# Patient Record
Sex: MALE | Race: WHITE | URBAN - METROPOLITAN AREA
[De-identification: names, ages, dates, MRNs, and addresses within clinical notes are randomized per-mention and may not be internally consistent; named-entity substitution may affect disease eponyms.]

---

## 2024-01-03 ENCOUNTER — OFFICE VISIT (OUTPATIENT)
Age: 89
End: 2024-01-03
Payer: MEDICARE

## 2024-01-03 VITALS
RESPIRATION RATE: 17 BRPM | HEART RATE: 77 BPM | WEIGHT: 196 LBS | HEIGHT: 71 IN | SYSTOLIC BLOOD PRESSURE: 142 MMHG | OXYGEN SATURATION: 98 % | DIASTOLIC BLOOD PRESSURE: 90 MMHG | BODY MASS INDEX: 27.44 KG/M2

## 2024-01-03 DIAGNOSIS — C44.42 SQUAMOUS CELL CARCINOMA OF SKIN OF SCALP: Primary | ICD-10-CM

## 2024-01-03 PROCEDURE — G8484 FLU IMMUNIZE NO ADMIN: HCPCS | Performed by: OTOLARYNGOLOGY

## 2024-01-03 PROCEDURE — 99203 OFFICE O/P NEW LOW 30 MIN: CPT | Performed by: OTOLARYNGOLOGY

## 2024-01-03 PROCEDURE — 1123F ACP DISCUSS/DSCN MKR DOCD: CPT | Performed by: OTOLARYNGOLOGY

## 2024-01-03 PROCEDURE — G8419 CALC BMI OUT NRM PARAM NOF/U: HCPCS | Performed by: OTOLARYNGOLOGY

## 2024-01-03 PROCEDURE — 1036F TOBACCO NON-USER: CPT | Performed by: OTOLARYNGOLOGY

## 2024-01-03 PROCEDURE — G8427 DOCREV CUR MEDS BY ELIG CLIN: HCPCS | Performed by: OTOLARYNGOLOGY

## 2024-01-03 RX ORDER — AMIODARONE HYDROCHLORIDE 100 MG/1
100 TABLET ORAL DAILY
COMMUNITY

## 2024-01-03 RX ORDER — PANTOPRAZOLE SODIUM 40 MG/1
40 TABLET, DELAYED RELEASE ORAL DAILY
COMMUNITY
Start: 2023-08-11

## 2024-01-03 RX ORDER — FERROUS SULFATE 325(65) MG
1 TABLET ORAL DAILY
COMMUNITY
Start: 2023-09-06

## 2024-01-03 RX ORDER — TAMSULOSIN HYDROCHLORIDE 0.4 MG/1
CAPSULE ORAL
COMMUNITY
Start: 2018-04-04

## 2024-01-03 ASSESSMENT — ENCOUNTER SYMPTOMS
ABDOMINAL PAIN: 0
VOICE CHANGE: 0
STRIDOR: 0
CHOKING: 0
EYE ITCHING: 0
SHORTNESS OF BREATH: 0
SINUS PRESSURE: 0
VOMITING: 0
NAUSEA: 0
EYE DISCHARGE: 0
SINUS PAIN: 0
RHINORRHEA: 0
COUGH: 0
APNEA: 0
TROUBLE SWALLOWING: 0
SORE THROAT: 0
BACK PAIN: 1

## 2024-01-03 NOTE — PROGRESS NOTES
Subjective:    Aaron Santamaria   88 y.o.   1935     New Patient Visit    Chief Complaint   Patient presents with    New Patient     Skin cancer consult     History of Present Illness:    1/3/2024- office    88-year-old male presents with a vertex scalp lesion.  He does have history of several skin cancers in the past, notably on the ear.  Scalp lesion was noted for several weeks had a biopsy done in December 2023, per report positive for squamous cell carcinoma.  Sent here for further excision.  He does not take any aspirin or blood thinners.    Review of Systems  Review of Systems   Constitutional:  Negative for activity change, appetite change, chills, fatigue and fever.   HENT:  Positive for hearing loss. Negative for congestion, ear discharge, ear pain, mouth sores, nosebleeds, postnasal drip, rhinorrhea, sinus pressure, sinus pain, sneezing, sore throat, tinnitus, trouble swallowing and voice change.    Eyes:  Negative for discharge, itching and visual disturbance.   Respiratory:  Negative for apnea, cough, choking, shortness of breath and stridor.    Cardiovascular:  Negative for chest pain and palpitations.   Gastrointestinal:  Negative for abdominal pain, nausea and vomiting.   Endocrine: Negative for cold intolerance and heat intolerance.   Genitourinary:  Negative for difficulty urinating and dysuria.   Musculoskeletal:  Positive for arthralgias, back pain, gait problem and myalgias. Negative for neck pain and neck stiffness.   Skin:  Negative for rash and wound.   Allergic/Immunologic: Negative for environmental allergies and food allergies.   Neurological:  Negative for dizziness, speech difficulty, light-headedness and headaches.   Hematological:  Negative for adenopathy. Does not bruise/bleed easily.   Psychiatric/Behavioral:  Negative for confusion and sleep disturbance. The patient is not nervous/anxious.            Past Medical History:   Diagnosis Date    Stomach ulcer      Past Surgical

## 2024-01-15 RX ORDER — FUROSEMIDE 20 MG/1
20 TABLET ORAL DAILY
COMMUNITY

## 2024-01-17 ENCOUNTER — APPOINTMENT (OUTPATIENT)
Facility: HOSPITAL | Age: 89
End: 2024-01-17
Attending: SURGERY
Payer: MEDICARE

## 2024-01-17 ENCOUNTER — HOSPITAL ENCOUNTER (OUTPATIENT)
Facility: HOSPITAL | Age: 89
Discharge: HOME | End: 2024-01-17
Attending: SURGERY | Admitting: SURGERY
Payer: MEDICARE

## 2024-01-17 ENCOUNTER — ANESTHESIA EVENT (OUTPATIENT)
Facility: HOSPITAL | Age: 89
End: 2024-01-17
Payer: MEDICARE

## 2024-01-17 ENCOUNTER — ANESTHESIA (OUTPATIENT)
Facility: HOSPITAL | Age: 89
End: 2024-01-17
Payer: MEDICARE

## 2024-01-17 VITALS
RESPIRATION RATE: 18 BRPM | SYSTOLIC BLOOD PRESSURE: 149 MMHG | HEART RATE: 93 BPM | TEMPERATURE: 97.4 F | OXYGEN SATURATION: 97 % | DIASTOLIC BLOOD PRESSURE: 87 MMHG

## 2024-01-17 DIAGNOSIS — C43.9 MALIGNANT MELANOMA, UNSPECIFIED SITE (HCC): ICD-10-CM

## 2024-01-17 DIAGNOSIS — C43.62 MALIGNANT MELANOMA OF LEFT UPPER EXTREMITY INCLUDING SHOULDER (HCC): ICD-10-CM

## 2024-01-17 PROBLEM — D03.9 MELANOMA IN SITU (HCC): Status: ACTIVE | Noted: 2024-01-17

## 2024-01-17 LAB
ANION GAP BLD CALC-SCNC: 10
CA-I BLD-MCNC: 1.03 MMOL/L (ref 1.12–1.32)
CHLORIDE BLD-SCNC: 106 MMOL/L (ref 98–107)
CO2 BLD-SCNC: 24 MMOL/L
CREAT UR-MCNC: 0.88 MG/DL (ref 0.6–1.3)
GLUCOSE BLD STRIP.AUTO-MCNC: 91 MG/DL (ref 65–100)
POTASSIUM BLD-SCNC: 5.4 MMOL/L (ref 3.5–5.5)
SODIUM BLD-SCNC: 139 MMOL/L (ref 136–145)

## 2024-01-17 PROCEDURE — 2720000010 HC SURG SUPPLY STERILE: Performed by: SURGERY

## 2024-01-17 PROCEDURE — 6360000002 HC RX W HCPCS: Performed by: NURSE ANESTHETIST, CERTIFIED REGISTERED

## 2024-01-17 PROCEDURE — A9520 TC99 TILMANOCEPT DIAG 0.5MCI: HCPCS | Performed by: SURGERY

## 2024-01-17 PROCEDURE — 88341 IMHCHEM/IMCYTCHM EA ADD ANTB: CPT

## 2024-01-17 PROCEDURE — 2500000003 HC RX 250 WO HCPCS: Performed by: SURGERY

## 2024-01-17 PROCEDURE — 3600000013 HC SURGERY LEVEL 3 ADDTL 15MIN: Performed by: SURGERY

## 2024-01-17 PROCEDURE — 7100000010 HC PHASE II RECOVERY - FIRST 15 MIN: Performed by: SURGERY

## 2024-01-17 PROCEDURE — 7100000000 HC PACU RECOVERY - FIRST 15 MIN: Performed by: SURGERY

## 2024-01-17 PROCEDURE — 88342 IMHCHEM/IMCYTCHM 1ST ANTB: CPT

## 2024-01-17 PROCEDURE — 3700000000 HC ANESTHESIA ATTENDED CARE: Performed by: SURGERY

## 2024-01-17 PROCEDURE — 6360000002 HC RX W HCPCS: Performed by: SURGERY

## 2024-01-17 PROCEDURE — 3430000000 HC RX DIAGNOSTIC RADIOPHARMACEUTICAL: Performed by: SURGERY

## 2024-01-17 PROCEDURE — 2580000003 HC RX 258: Performed by: SURGERY

## 2024-01-17 PROCEDURE — 2500000003 HC RX 250 WO HCPCS: Performed by: NURSE ANESTHETIST, CERTIFIED REGISTERED

## 2024-01-17 PROCEDURE — 88307 TISSUE EXAM BY PATHOLOGIST: CPT

## 2024-01-17 PROCEDURE — 3700000001 HC ADD 15 MINUTES (ANESTHESIA): Performed by: SURGERY

## 2024-01-17 PROCEDURE — 80047 BASIC METABLC PNL IONIZED CA: CPT

## 2024-01-17 PROCEDURE — 78195 LYMPH SYSTEM IMAGING: CPT

## 2024-01-17 PROCEDURE — 6370000000 HC RX 637 (ALT 250 FOR IP): Performed by: ANESTHESIOLOGY

## 2024-01-17 PROCEDURE — 7100000001 HC PACU RECOVERY - ADDTL 15 MIN: Performed by: SURGERY

## 2024-01-17 PROCEDURE — 3600000003 HC SURGERY LEVEL 3 BASE: Performed by: SURGERY

## 2024-01-17 PROCEDURE — 88305 TISSUE EXAM BY PATHOLOGIST: CPT

## 2024-01-17 PROCEDURE — 2709999900 HC NON-CHARGEABLE SUPPLY: Performed by: SURGERY

## 2024-01-17 PROCEDURE — 7100000011 HC PHASE II RECOVERY - ADDTL 15 MIN: Performed by: SURGERY

## 2024-01-17 RX ORDER — FENTANYL CITRATE 50 UG/ML
50 INJECTION, SOLUTION INTRAMUSCULAR; INTRAVENOUS EVERY 5 MIN PRN
Status: DISCONTINUED | OUTPATIENT
Start: 2024-01-17 | End: 2024-01-17 | Stop reason: HOSPADM

## 2024-01-17 RX ORDER — DEXTROSE MONOHYDRATE 100 MG/ML
INJECTION, SOLUTION INTRAVENOUS CONTINUOUS PRN
Status: DISCONTINUED | OUTPATIENT
Start: 2024-01-17 | End: 2024-01-17 | Stop reason: HOSPADM

## 2024-01-17 RX ORDER — LIDOCAINE HYDROCHLORIDE AND EPINEPHRINE BITARTRATE 20; .01 MG/ML; MG/ML
INJECTION, SOLUTION SUBCUTANEOUS PRN
Status: DISCONTINUED | OUTPATIENT
Start: 2024-01-17 | End: 2024-01-17 | Stop reason: HOSPADM

## 2024-01-17 RX ORDER — SODIUM CHLORIDE, SODIUM LACTATE, POTASSIUM CHLORIDE, CALCIUM CHLORIDE 600; 310; 30; 20 MG/100ML; MG/100ML; MG/100ML; MG/100ML
INJECTION, SOLUTION INTRAVENOUS CONTINUOUS
Status: DISCONTINUED | OUTPATIENT
Start: 2024-01-17 | End: 2024-01-17 | Stop reason: HOSPADM

## 2024-01-17 RX ORDER — GLUCAGON 1 MG/ML
1 KIT INJECTION PRN
Status: DISCONTINUED | OUTPATIENT
Start: 2024-01-17 | End: 2024-01-17 | Stop reason: HOSPADM

## 2024-01-17 RX ORDER — SODIUM CHLORIDE 9 MG/ML
INJECTION, SOLUTION INTRAVENOUS PRN
Status: DISCONTINUED | OUTPATIENT
Start: 2024-01-17 | End: 2024-01-17 | Stop reason: HOSPADM

## 2024-01-17 RX ORDER — HYDROMORPHONE HYDROCHLORIDE 1 MG/ML
0.5 INJECTION, SOLUTION INTRAMUSCULAR; INTRAVENOUS; SUBCUTANEOUS EVERY 5 MIN PRN
Status: DISCONTINUED | OUTPATIENT
Start: 2024-01-17 | End: 2024-01-17 | Stop reason: HOSPADM

## 2024-01-17 RX ORDER — SODIUM CHLORIDE 0.9 % (FLUSH) 0.9 %
5-40 SYRINGE (ML) INJECTION PRN
Status: DISCONTINUED | OUTPATIENT
Start: 2024-01-17 | End: 2024-01-17 | Stop reason: HOSPADM

## 2024-01-17 RX ORDER — HYDRALAZINE HYDROCHLORIDE 20 MG/ML
10 INJECTION INTRAMUSCULAR; INTRAVENOUS
Status: DISCONTINUED | OUTPATIENT
Start: 2024-01-17 | End: 2024-01-17 | Stop reason: HOSPADM

## 2024-01-17 RX ORDER — LORAZEPAM 2 MG/ML
0.5 INJECTION INTRAMUSCULAR
Status: DISCONTINUED | OUTPATIENT
Start: 2024-01-17 | End: 2024-01-17 | Stop reason: HOSPADM

## 2024-01-17 RX ORDER — LIDOCAINE HYDROCHLORIDE 20 MG/ML
INJECTION, SOLUTION EPIDURAL; INFILTRATION; INTRACAUDAL; PERINEURAL PRN
Status: DISCONTINUED | OUTPATIENT
Start: 2024-01-17 | End: 2024-01-17 | Stop reason: SDUPTHER

## 2024-01-17 RX ORDER — DEXMEDETOMIDINE HYDROCHLORIDE 100 UG/ML
INJECTION, SOLUTION INTRAVENOUS PRN
Status: DISCONTINUED | OUTPATIENT
Start: 2024-01-17 | End: 2024-01-17 | Stop reason: SDUPTHER

## 2024-01-17 RX ORDER — ONDANSETRON 2 MG/ML
4 INJECTION INTRAMUSCULAR; INTRAVENOUS
Status: DISCONTINUED | OUTPATIENT
Start: 2024-01-17 | End: 2024-01-17 | Stop reason: HOSPADM

## 2024-01-17 RX ORDER — OXYCODONE HYDROCHLORIDE 5 MG/1
10 TABLET ORAL PRN
Status: DISCONTINUED | OUTPATIENT
Start: 2024-01-17 | End: 2024-01-17 | Stop reason: HOSPADM

## 2024-01-17 RX ORDER — MEPERIDINE HYDROCHLORIDE 25 MG/ML
12.5 INJECTION INTRAMUSCULAR; INTRAVENOUS; SUBCUTANEOUS EVERY 5 MIN PRN
Status: DISCONTINUED | OUTPATIENT
Start: 2024-01-17 | End: 2024-01-17 | Stop reason: HOSPADM

## 2024-01-17 RX ORDER — FENTANYL CITRATE 50 UG/ML
INJECTION, SOLUTION INTRAMUSCULAR; INTRAVENOUS PRN
Status: DISCONTINUED | OUTPATIENT
Start: 2024-01-17 | End: 2024-01-17 | Stop reason: SDUPTHER

## 2024-01-17 RX ORDER — METOCLOPRAMIDE HYDROCHLORIDE 5 MG/ML
10 INJECTION INTRAMUSCULAR; INTRAVENOUS
Status: DISCONTINUED | OUTPATIENT
Start: 2024-01-17 | End: 2024-01-17 | Stop reason: HOSPADM

## 2024-01-17 RX ORDER — HYDROCODONE BITARTRATE AND ACETAMINOPHEN 5; 325 MG/1; MG/1
1 TABLET ORAL EVERY 6 HOURS PRN
Status: DISCONTINUED | OUTPATIENT
Start: 2024-01-17 | End: 2024-01-17 | Stop reason: HOSPADM

## 2024-01-17 RX ORDER — SODIUM CHLORIDE 0.9 % (FLUSH) 0.9 %
5-40 SYRINGE (ML) INJECTION EVERY 12 HOURS SCHEDULED
Status: DISCONTINUED | OUTPATIENT
Start: 2024-01-17 | End: 2024-01-17 | Stop reason: HOSPADM

## 2024-01-17 RX ORDER — PROPOFOL 10 MG/ML
INJECTION, EMULSION INTRAVENOUS PRN
Status: DISCONTINUED | OUTPATIENT
Start: 2024-01-17 | End: 2024-01-17 | Stop reason: SDUPTHER

## 2024-01-17 RX ORDER — BUPIVACAINE HYDROCHLORIDE 2.5 MG/ML
INJECTION, SOLUTION EPIDURAL; INFILTRATION; INTRACAUDAL PRN
Status: DISCONTINUED | OUTPATIENT
Start: 2024-01-17 | End: 2024-01-17 | Stop reason: HOSPADM

## 2024-01-17 RX ORDER — AMIODARONE HYDROCHLORIDE 200 MG/1
100 TABLET ORAL ONCE
Status: COMPLETED | OUTPATIENT
Start: 2024-01-17 | End: 2024-01-17

## 2024-01-17 RX ORDER — LABETALOL HYDROCHLORIDE 5 MG/ML
10 INJECTION, SOLUTION INTRAVENOUS
Status: DISCONTINUED | OUTPATIENT
Start: 2024-01-17 | End: 2024-01-17 | Stop reason: HOSPADM

## 2024-01-17 RX ORDER — OXYCODONE HYDROCHLORIDE 5 MG/1
5 TABLET ORAL PRN
Status: DISCONTINUED | OUTPATIENT
Start: 2024-01-17 | End: 2024-01-17 | Stop reason: HOSPADM

## 2024-01-17 RX ORDER — IPRATROPIUM BROMIDE AND ALBUTEROL SULFATE 2.5; .5 MG/3ML; MG/3ML
1 SOLUTION RESPIRATORY (INHALATION)
Status: DISCONTINUED | OUTPATIENT
Start: 2024-01-17 | End: 2024-01-17 | Stop reason: HOSPADM

## 2024-01-17 RX ORDER — DIPHENHYDRAMINE HYDROCHLORIDE 50 MG/ML
12.5 INJECTION INTRAMUSCULAR; INTRAVENOUS
Status: DISCONTINUED | OUTPATIENT
Start: 2024-01-17 | End: 2024-01-17 | Stop reason: HOSPADM

## 2024-01-17 RX ORDER — PHENYLEPHRINE HCL IN 0.9% NACL 1 MG/10 ML
SYRINGE (ML) INTRAVENOUS PRN
Status: DISCONTINUED | OUTPATIENT
Start: 2024-01-17 | End: 2024-01-17 | Stop reason: SDUPTHER

## 2024-01-17 RX ORDER — LIDOCAINE 4 G/G
1 PATCH TOPICAL AS NEEDED
Status: DISCONTINUED | OUTPATIENT
Start: 2024-01-17 | End: 2024-01-17 | Stop reason: HOSPADM

## 2024-01-17 RX ORDER — SODIUM CHLORIDE, SODIUM LACTATE, POTASSIUM CHLORIDE, CALCIUM CHLORIDE 600; 310; 30; 20 MG/100ML; MG/100ML; MG/100ML; MG/100ML
INJECTION, SOLUTION INTRAVENOUS ONCE
Status: DISCONTINUED | OUTPATIENT
Start: 2024-01-17 | End: 2024-01-17 | Stop reason: HOSPADM

## 2024-01-17 RX ORDER — HYDROCODONE BITARTRATE AND ACETAMINOPHEN 5; 325 MG/1; MG/1
1 TABLET ORAL EVERY 6 HOURS PRN
Qty: 15 TABLET | Refills: 0 | Status: SHIPPED | OUTPATIENT
Start: 2024-01-17 | End: 2024-01-21

## 2024-01-17 RX ADMIN — Medication 200 MCG: at 12:32

## 2024-01-17 RX ADMIN — PROPOFOL 30 MG: 10 INJECTION, EMULSION INTRAVENOUS at 11:25

## 2024-01-17 RX ADMIN — Medication 100 MCG: at 11:56

## 2024-01-17 RX ADMIN — Medication 200 MCG: at 12:18

## 2024-01-17 RX ADMIN — SODIUM CHLORIDE, POTASSIUM CHLORIDE, SODIUM LACTATE AND CALCIUM CHLORIDE: 600; 310; 30; 20 INJECTION, SOLUTION INTRAVENOUS at 11:10

## 2024-01-17 RX ADMIN — Medication 100 MCG: at 12:04

## 2024-01-17 RX ADMIN — CEFAZOLIN SODIUM 2000 MG: 1 INJECTION, POWDER, FOR SOLUTION INTRAMUSCULAR; INTRAVENOUS at 11:16

## 2024-01-17 RX ADMIN — Medication 200 MCG: at 12:24

## 2024-01-17 RX ADMIN — TILMANOCEPT 0.5 MILLICURIE: KIT at 09:50

## 2024-01-17 RX ADMIN — FENTANYL CITRATE 50 MCG: 50 INJECTION, SOLUTION INTRAMUSCULAR; INTRAVENOUS at 11:24

## 2024-01-17 RX ADMIN — FENTANYL CITRATE 25 MCG: 50 INJECTION, SOLUTION INTRAMUSCULAR; INTRAVENOUS at 12:28

## 2024-01-17 RX ADMIN — DEXMEDETOMIDINE 10 MCG: 100 INJECTION, SOLUTION INTRAVENOUS at 11:20

## 2024-01-17 RX ADMIN — FENTANYL CITRATE 25 MCG: 50 INJECTION, SOLUTION INTRAMUSCULAR; INTRAVENOUS at 11:47

## 2024-01-17 RX ADMIN — AMIODARONE HYDROCHLORIDE 100 MG: 200 TABLET ORAL at 09:10

## 2024-01-17 RX ADMIN — PROPOFOL 100 MCG/KG/MIN: 10 INJECTION, EMULSION INTRAVENOUS at 11:30

## 2024-01-17 RX ADMIN — Medication 200 MCG: at 12:12

## 2024-01-17 RX ADMIN — LIDOCAINE HYDROCHLORIDE 100 MG: 20 INJECTION, SOLUTION EPIDURAL; INFILTRATION; INTRACAUDAL; PERINEURAL at 11:24

## 2024-01-17 ASSESSMENT — PAIN SCALES - GENERAL
PAINLEVEL_OUTOF10: 0

## 2024-01-17 ASSESSMENT — PAIN - FUNCTIONAL ASSESSMENT
PAIN_FUNCTIONAL_ASSESSMENT: NONE - DENIES PAIN
PAIN_FUNCTIONAL_ASSESSMENT: NONE - DENIES PAIN
PAIN_FUNCTIONAL_ASSESSMENT: 0-10

## 2024-01-17 NOTE — H&P
CC: Left forearm melanoma    HPI: Mr. 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 Wu. 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     Current Outpatient Medications on File Prior to Encounter   Medication Sig Dispense Refill    furosemide (LASIX) 20 MG tablet Take 1 tablet by mouth daily      amiodarone (PACERONE) 100 MG tablet Take 1 tablet by mouth daily      ferrous sulfate (IRON 325) 325 (65 Fe) MG tablet Take 1 tablet by mouth daily      pantoprazole (PROTONIX) 40 MG tablet Take 1 tablet by mouth daily      tamsulosin (FLOMAX) 0.4 MG capsule As Directed Oral for 0        No Known Allergies    Social History Patient is a . Patient denies current alcohol and/or drug use. Tobacco Use and Cessation Counseling Former smoker. Cessation not discussed. Cessation  Not applicable.     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 He also worked at [employer]. He retired in 1993. 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     Family History Father  melanoma       Review of Systems 10 point review of systems negative except for that which I mentioned in the HPI    Physical Exam  BP (!) 153/117   Pulse (!) 135   Temp 97.5 °F (36.4 °C) (Oral)   Resp 20   SpO2 100%     Constitutional: Alert/cooperative/oriented? appears close to chronological age? well nourished.   Head:

## 2024-01-17 NOTE — OP NOTE
Operative Note      Patient: Aaron Santamaria  YOB: 1935  MRN: [id number]    Date of Procedure: 1/17/2024    Pre-Op Diagnosis Codes:     * Malignant melanoma, unspecified site (HCC) [C43.9]    Post-Op Diagnosis: Same       Procedure(s):  WIDE EXCISION OF LEFT UPPER EXTREMITY MELANOMA WITH SENTINEL LYMPH NODE BIOPSY  12x5 cm of area excised in the left upper extremity    Surgeon(s):  Vaishali Betancourt MD    Assistant:   Surgical Assistant: Angel Davis    Anesthesia: Monitor Anesthesia Care    Estimated Blood Loss (mL): Minimal    Complications: None    Specimens:   Wide local excision  3 nodes  Margins.     Implants:  * No implants in log *      Drains: * No LDAs found *    Findings: 2 sentinel nodes, 1 palpable node removed for permanent pathologic analysis.     This procedure was not performed to treat breast cancer through axillary lymph node dissection  Operation performed with curative intent: Yes  Original Breslow thickness of the lesion:  3.5.  mm  Clinical margin width (measured from the edge of the lesion or the prior excision scar): Other (specify): 1.5 cm due to cosmetic/anatomic concerns  Depth of excision: Full-thickness skin/subcutaneous tissue down to fascia (melanoma)     Detailed Description of Procedure:       Indications: Mr. Santamaria is a 88 year old man who presented for wide local excision of a left forearm melanoma and sentinel node biopsy. Prior to the procedure the patient expressed understanding of the risks of bleeding, infection, need for further procedures. Prior to the procedure he underwent a radiotracer injection at the site of the melanoma with lymphoscintigraphy demonstrating drainage to the left axilla.     Procedure: The patient was taken back to the OR and placed on the operating room table in the supine position. After induction of sedation, the left arm and axilla were prepped in the usual sterile fashion. A time out was performed according to standard  woken up and transported to the PACU in stable condition. I was present for the entire procedure.     Operation performed with curative intent: Yes  Original Breslow thickness of the lesion: 3.5 mm  Clinical margin width (measured from the edge of the lesion or the prior excision scar): Other (specify): 1.5 cm due to cosmetic/anatomic concerns  Depth of excision: Full-thickness skin/subcutaneous tissue down to fascia (melanoma)      Vaishali Betancourt MD:    Electronically signed by Vaishali Betancourt MD on 1/17/2024 at 10:51 AM

## 2024-01-17 NOTE — DISCHARGE INSTRUCTIONS
Can shower on Friday  Leave white tape strips on   No strenuous activity   Take pain medication as directed

## 2024-01-17 NOTE — PROGRESS NOTES
RN notified Dr. Winter of patient's blood pressure 153/117. Orders received to give patient amiodarone 100mg once.

## 2024-01-17 NOTE — BRIEF OP NOTE
Brief Postoperative Note      Patient: Aaron Santamaria  YOB: 1935  MRN: [id number]    Date of Procedure: 1/17/2024    Pre-Op Diagnosis Codes:     * Malignant melanoma, unspecified site (HCC) [C43.9]    Post-Op Diagnosis: Same       Procedure(s):  WIDE EXCISION OF LEFT UPPER EXTREMITY MELANOMA WITH SENTINEL LYMPH NODE BIOPSY    Surgeon(s):  Vaishali Betancourt MD    Assistant:  Surgical Assistant: Angel Davis    Anesthesia: Monitor Anesthesia Care    Estimated Blood Loss (mL): Minimal    Complications: None    Specimens:   Wide excision  Margins  nodes    Implants:  * No implants in log *      Drains: * No LDAs found *    Findings: 1 palpable node, 2 sentinel nodes  This procedure was not performed to treat breast cancer through axillary lymph node dissection  Operation performed with curative intent: Yes  Original Breslow thickness of the lesion:3.5 mm  Clinical margin width (measured from the edge of the lesion or the prior excision scar): Other (specify): 1.5 cm due to cosmetic/anatomic concerns  Depth of excision: Full-thickness skin/subcutaneous tissue down to fascia (melanoma)      Electronically signed by Vaishali Betancourt MD on 1/17/2024 at 10:51 AM

## 2024-01-17 NOTE — ANESTHESIA PRE PROCEDURE
given: Not Answered      Vital Signs (Current):   Vitals:    01/17/24 0824   BP: (!) 153/117   Pulse: (!) 135   Resp: 20   Temp: 97.5 °F (36.4 °C)   TempSrc: Oral   SpO2: 100%                                              BP Readings from Last 3 Encounters:   01/17/24 (!) 153/117   01/03/24 (!) 142/90       NPO Status: Time of last liquid consumption: 1830                        Time of last solid consumption: 1830                        Date of last liquid consumption: 01/16/24                        Date of last solid food consumption: 01/16/24    BMI:   Wt Readings from Last 3 Encounters:   01/03/24 88.9 kg (196 lb)     There is no height or weight on file to calculate BMI.    CBC: No results found for: \"WBC\", \"RBC\", \"HGB\", \"HCT\", \"MCV\", \"RDW\", \"PLT\"    CMP:   Lab Results   Component Value Date/Time    CREATININE 0.88 01/17/2024 09:19 AM       POC Tests:   Recent Labs     01/17/24  0919   POCGLU 91   POCNA 139   POCK 5.4   POCCL 106       Coags: No results found for: \"PROTIME\", \"INR\", \"APTT\"    HCG (If Applicable): No results found for: \"PREGTESTUR\", \"PREGSERUM\", \"HCG\", \"HCGQUANT\"     ABGs: No results found for: \"PHART\", \"PO2ART\", \"ROP7PGG\", \"LQK6NVM\", \"BEART\", \"P4OVNMQW\"     Type & Screen (If Applicable):  No results found for: \"LABABO\", \"LABRH\"    Drug/Infectious Status (If Applicable):  No results found for: \"HIV\", \"HEPCAB\"    COVID-19 Screening (If Applicable): No results found for: \"COVID19\"        Anesthesia Evaluation  Patient summary reviewed and Nursing notes reviewed   no history of anesthetic complications:   Airway: Mallampati: II  TM distance: >3 FB   Neck ROM: full  Mouth opening: > = 3 FB   Dental: normal exam         Pulmonary:normal exam  breath sounds clear to auscultation  (+)     sleep apnea:                                  Cardiovascular:    (+) dysrhythmias: atrial fibrillation        Rhythm: regular  Rate: normal                    Neuro/Psych:   Negative Neuro/Psych ROS

## 2024-01-17 NOTE — PROGRESS NOTES
Discharge instructions reviewed with patient and patient's family. No further questions. IV removed; cath tip intact. Patient to be transported home by family.

## 2024-01-20 NOTE — ANESTHESIA POSTPROCEDURE EVALUATION
Department of Anesthesiology  Postprocedure Note    Patient: Aaron Santamaria  MRN: [id number]  YOB: 1935    Procedure Summary       Date: 01/17/24 Room / Location: [facility] MAIN OR 06 / [facility] MAIN OR    Anesthesia Start: 1115 Anesthesia Stop: 1250    Procedure: WIDE EXCISION OF LEFT EXTREMITY MELANOMA WITH SENTINEL LYMPH NODE BIOPSY (Left: Axilla) Diagnosis:       Malignant melanoma, unspecified site (HCC)      (Malignant melanoma, unspecified site (HCC) [C43.9])    Surgeons: Vaishali Betancourt MD Responsible Provider: Fortunato Winter MD    Anesthesia Type: MAC, TIVA ASA Status: 3            Anesthesia Type: MAC, TIVA    Joanie Phase I: Joanie Score: 10    Joanie Phase II: Joanie Score: 10    Anesthesia Post Evaluation    Patient location during evaluation: PACU  Patient participation: complete - patient cannot participate  Level of consciousness: awake  Pain score: 0  Airway patency: patent  Nausea & Vomiting: no nausea and no vomiting  Cardiovascular status: hemodynamically stable  Respiratory status: acceptable  Hydration status: stable  Multimodal analgesia pain management approach        No notable events documented.

## 2024-01-26 ENCOUNTER — OFFICE VISIT (OUTPATIENT)
Age: 89
End: 2024-01-26

## 2024-01-26 ENCOUNTER — HOSPITAL ENCOUNTER (OUTPATIENT)
Facility: HOSPITAL | Age: 89
Setting detail: SPECIMEN
Discharge: HOME | End: 2024-01-29

## 2024-01-26 VITALS
SYSTOLIC BLOOD PRESSURE: 136 MMHG | OXYGEN SATURATION: 97 % | HEART RATE: 112 BPM | RESPIRATION RATE: 18 BRPM | HEIGHT: 71 IN | DIASTOLIC BLOOD PRESSURE: 80 MMHG | BODY MASS INDEX: 27.44 KG/M2 | WEIGHT: 196 LBS

## 2024-01-26 DIAGNOSIS — C44.42 SQUAMOUS CELL CARCINOMA OF SKIN OF SCALP: Primary | ICD-10-CM

## 2024-01-26 NOTE — PROGRESS NOTES
Excision Malignant Neoplasm of scalp with full-thickness skin graft    Informed consent was obtained. The area surrounding the large vertex scalp skin cancer was cleansed with alcohol then injected with  6 mL of 1% lidocaine with 1:100,000 parts epinephrine. We then prepped the area with betadine and draped in sterile fashion. A 15 blade was used to incise the skin surrounding the lesion with gross margins, down to the pericranium with a 1 cm area of exposed bone. Scalpel and curved scissors were used to complete the excision. The specimen is suture marked for pathologic orientation and sent for frozen section. Hemostasis was achieved with bipolar cautery and a temporary pressure dressing was applied.    Frozen section revealed negative margins.  The excision/defect is 3 cm in diameter.    I harvested a full-thickness graft from the right supraclavicular neck.  Skin was cleansed with alcohol, injected with additional local anesthetic and then the skin is incised with a 15 blade and the graft is harvested.  Hemostasis is achieved and then the donor site is closed primarily with a layered closure.     The subcutaneous fat is then trimmed off the graft and then inset into the scalp defect.  The graft is sutured in 4 quadrants with 2-0 silk stay suture.  The intervening skin is closed with interrupted 4-0 nylon.  A bolster dressing of Xeroform and cotton balls is fashioned placed over the graft and then tied down using the stay suture.  The scalp skin is cleaned and dried.    The right neck donor site is cleaned and dried and Steri-Strips and a sterile pressure dressing are applied.

## 2024-02-02 ENCOUNTER — OFFICE VISIT (OUTPATIENT)
Age: 89
End: 2024-02-02

## 2024-02-02 VITALS
HEIGHT: 71 IN | SYSTOLIC BLOOD PRESSURE: 140 MMHG | HEART RATE: 99 BPM | BODY MASS INDEX: 27.44 KG/M2 | DIASTOLIC BLOOD PRESSURE: 88 MMHG | WEIGHT: 196 LBS | RESPIRATION RATE: 18 BRPM | OXYGEN SATURATION: 98 %

## 2024-02-02 DIAGNOSIS — C44.42 SQUAMOUS CELL CARCINOMA OF SKIN OF SCALP: Primary | ICD-10-CM

## 2024-02-02 PROCEDURE — 99024 POSTOP FOLLOW-UP VISIT: CPT | Performed by: OTOLARYNGOLOGY

## 2024-02-02 NOTE — PROGRESS NOTES
Subjective:    Aaron Santamaria   88 y.o.   1935     Followup Visit    Chief Complaint   Patient presents with    Follow-up     Squamous cell carcinoma of skin of scalp     History of Present Illness:    1/3/2024- office    88-year-old male presents with a vertex scalp lesion.  He does have history of several skin cancers in the past, notably on the ear.  Scalp lesion was noted for several weeks had a biopsy done in December 2023, per report positive for squamous cell carcinoma.  Sent here for further excision.  He does not take any aspirin or blood thinners.    2/2/24  1 week f/u wound check      Review of Systems          Past Medical History:   Diagnosis Date    Atrial fibrillation, controlled (HCC)     Melanoma (HCC)     Sleep apnea     no cpap    Stomach ulcer      Past Surgical History:   Procedure Laterality Date    AXILLARY SURGERY Left 1/17/2024    WIDE EXCISION OF LEFT EXTREMITY MELANOMA WITH SENTINEL LYMPH NODE BIOPSY performed by Vaishali Betancourt MD at [facility] MAIN OR    CHOLECYSTECTOMY      KNEE ARTHROPLASTY Right       History reviewed. No pertinent family history.  Social History     Tobacco Use    Smoking status: Never    Smokeless tobacco: Never   Substance Use Topics    Alcohol use: Never      Prior to Admission medications    Medication Sig Start Date End Date Taking? Authorizing Provider   furosemide (LASIX) 20 MG tablet Take 1 tablet by mouth daily    Tahira Tate MD   amiodarone (PACERONE) 100 MG tablet Take 1 tablet by mouth daily    Tahira Tate MD   ferrous sulfate (IRON 325) 325 (65 Fe) MG tablet Take 1 tablet by mouth daily 9/6/23   Tahira Tate MD   pantoprazole (PROTONIX) 40 MG tablet Take 1 tablet by mouth daily 8/11/23   Tahira Tate MD   tamsulosin (FLOMAX) 0.4 MG capsule As Directed Oral for 0 4/4/18   Tahira Tate MD        No Known Allergies      Objective:     BP (!) 140/88   Pulse 99   Resp 18   Ht 1.803 m (5' 11\")   Wt

## 2024-02-16 ENCOUNTER — OFFICE VISIT (OUTPATIENT)
Age: 89
End: 2024-02-16

## 2024-02-16 VITALS
BODY MASS INDEX: 27.44 KG/M2 | HEIGHT: 71 IN | HEART RATE: 98 BPM | DIASTOLIC BLOOD PRESSURE: 90 MMHG | OXYGEN SATURATION: 98 % | WEIGHT: 196 LBS | SYSTOLIC BLOOD PRESSURE: 140 MMHG | RESPIRATION RATE: 16 BRPM

## 2024-02-16 DIAGNOSIS — C44.42 SQUAMOUS CELL CARCINOMA OF SKIN OF SCALP: Primary | ICD-10-CM

## 2024-02-16 PROCEDURE — 99024 POSTOP FOLLOW-UP VISIT: CPT | Performed by: OTOLARYNGOLOGY

## 2024-02-16 NOTE — PROGRESS NOTES
140/90   Pulse 98   Resp 16   Ht 1.803 m (5' 11\")   Wt 88.9 kg (196 lb)   SpO2 98%   BMI 27.34 kg/m²      Right neck donor site incision is intact, clean, well-healed  Vertex scalp FTSG site with significant crusting, sharply debrided down to some granulation tissue laterally and healing epithelium in the midline.  Ointment applied.    FINAL PATHOLOGIC DIAGNOSIS     Skin, scalp vertex, excision:        Invasive well-differentiated squamous cell carcinoma.        Tumor is present at deep margin. Lateral margins free of tumor. See   comment.       Comment     Deeper sections from the frozen section control show marking ink directly   applied to tumor.     Assessment/Plan:       ICD-10-CM    1. Squamous cell carcinoma of skin of scalp  C44.42         S/p FTSG repair vertex scalp Jan 2024  Final path is showing tumor present at the deep margin (skull), will need to discuss next steps with dermatologist (Jesus)  Re-excision vs close observation, patient does not want more surgery  Wound care discussed see me back in 1 month, I will discuss with dermatologist.    No orders of the defined types were placed in this encounter.     Return for fu March 22nd morning for wound check.       Thank you for referring this patient,    Hector Bliss MD, FACS  Otolaryngology - Head & Neck Surgery  [facility]    [address]  [place] [id number] [address]  [place] [address]    O -   M -

## 2024-03-01 ENCOUNTER — OFFICE VISIT (OUTPATIENT)
Age: 89
End: 2024-03-01
Payer: MEDICARE

## 2024-03-01 VITALS
HEIGHT: 71 IN | HEART RATE: 122 BPM | DIASTOLIC BLOOD PRESSURE: 90 MMHG | OXYGEN SATURATION: 98 % | SYSTOLIC BLOOD PRESSURE: 142 MMHG | WEIGHT: 196 LBS | BODY MASS INDEX: 27.44 KG/M2

## 2024-03-01 DIAGNOSIS — C44.42 SQUAMOUS CELL CARCINOMA OF SKIN OF SCALP: Primary | ICD-10-CM

## 2024-03-01 PROCEDURE — 1036F TOBACCO NON-USER: CPT | Performed by: STUDENT IN AN ORGANIZED HEALTH CARE EDUCATION/TRAINING PROGRAM

## 2024-03-01 PROCEDURE — G8419 CALC BMI OUT NRM PARAM NOF/U: HCPCS | Performed by: STUDENT IN AN ORGANIZED HEALTH CARE EDUCATION/TRAINING PROGRAM

## 2024-03-01 PROCEDURE — 1123F ACP DISCUSS/DSCN MKR DOCD: CPT | Performed by: STUDENT IN AN ORGANIZED HEALTH CARE EDUCATION/TRAINING PROGRAM

## 2024-03-01 PROCEDURE — G8484 FLU IMMUNIZE NO ADMIN: HCPCS | Performed by: STUDENT IN AN ORGANIZED HEALTH CARE EDUCATION/TRAINING PROGRAM

## 2024-03-01 PROCEDURE — G8427 DOCREV CUR MEDS BY ELIG CLIN: HCPCS | Performed by: STUDENT IN AN ORGANIZED HEALTH CARE EDUCATION/TRAINING PROGRAM

## 2024-03-01 PROCEDURE — 99212 OFFICE O/P EST SF 10 MIN: CPT | Performed by: STUDENT IN AN ORGANIZED HEALTH CARE EDUCATION/TRAINING PROGRAM

## 2024-03-01 NOTE — PROGRESS NOTES
Subjective:    Aaron Santamaria   88 y.o.   1935     Followup Visit    Chief Complaint   Patient presents with    Follow-up     History of Present Illness:    1/3/2024- office    88-year-old male presents with a vertex scalp lesion.  He does have history of several skin cancers in the past, notably on the ear.  Scalp lesion was noted for several weeks had a biopsy done in December 2023, per report positive for squamous cell carcinoma.  Sent here for further excision.  He does not take any aspirin or blood thinners.    2/2/24  1 week f/u wound check    2/16/2024  2-week follow-up for wound check.  No pain or drainage reported    3/1/24:   Doing well post operatively, is supposed to see Dr. Bliss for possible reexcision.  Talked to patient regarding this, he will be rescheduled with [facility]. Wound looks like it is healing well. Some crusting still noted     Past Medical History:   Diagnosis Date    Atrial fibrillation, controlled (HCC)     Melanoma (HCC)     Sleep apnea     no cpap    Stomach ulcer      Past Surgical History:   Procedure Laterality Date    AXILLARY SURGERY Left 1/17/2024    WIDE EXCISION OF LEFT EXTREMITY MELANOMA WITH SENTINEL LYMPH NODE BIOPSY performed by Vaishali Betancourt MD at [facility] MAIN OR    CHOLECYSTECTOMY      KNEE ARTHROPLASTY Right       History reviewed. No pertinent family history.  Social History     Tobacco Use    Smoking status: Never    Smokeless tobacco: Never   Substance Use Topics    Alcohol use: Never      Prior to Admission medications    Medication Sig Start Date End Date Taking? Authorizing Provider   furosemide (LASIX) 20 MG tablet Take 1 tablet by mouth daily    Tahira Tate MD   amiodarone (PACERONE) 100 MG tablet Take 1 tablet by mouth daily    Tahira Tate MD   ferrous sulfate (IRON 325) 325 (65 Fe) MG tablet Take 1 tablet by mouth daily 9/6/23   Tahira Tate MD   pantoprazole (PROTONIX) 40 MG tablet Take 1 tablet by mouth daily

## 2024-03-22 ENCOUNTER — OFFICE VISIT (OUTPATIENT)
Age: 89
End: 2024-03-22

## 2024-03-22 VITALS
BODY MASS INDEX: 27.44 KG/M2 | HEART RATE: 120 BPM | SYSTOLIC BLOOD PRESSURE: 150 MMHG | OXYGEN SATURATION: 96 % | RESPIRATION RATE: 18 BRPM | HEIGHT: 71 IN | WEIGHT: 196 LBS | DIASTOLIC BLOOD PRESSURE: 90 MMHG

## 2024-03-22 DIAGNOSIS — C44.42 SQUAMOUS CELL CARCINOMA OF SKIN OF SCALP: Primary | ICD-10-CM

## 2024-03-22 PROCEDURE — 99024 POSTOP FOLLOW-UP VISIT: CPT | Performed by: OTOLARYNGOLOGY

## 2024-03-22 NOTE — PROGRESS NOTES
Subjective:    Aaron Santamaria   88 y.o.   1935     Followup Visit    Chief Complaint   Patient presents with    Follow-up     Squamous cell carcinoma of skin of scalp     History of Present Illness:    1/3/2024- office    88-year-old male presents with a vertex scalp lesion.  He does have history of several skin cancers in the past, notably on the ear.  Scalp lesion was noted for several weeks had a biopsy done in December 2023, per report positive for squamous cell carcinoma.  Sent here for further excision.  He does not take any aspirin or blood thinners.    2/2/24  1 week f/u wound check    2/16/2024  2-week follow-up for wound check.  No pain or drainage reported    3/22/2024  1 month follow-up, he has been doing well, no complaints     Past Medical History:   Diagnosis Date    Atrial fibrillation, controlled (HCC)     Melanoma (HCC)     Sleep apnea     no cpap    Stomach ulcer      Past Surgical History:   Procedure Laterality Date    AXILLARY SURGERY Left 1/17/2024    WIDE EXCISION OF LEFT EXTREMITY MELANOMA WITH SENTINEL LYMPH NODE BIOPSY performed by Vaishali Betancourt MD at [facility] MAIN OR    CHOLECYSTECTOMY      KNEE ARTHROPLASTY Right       History reviewed. No pertinent family history.  Social History     Tobacco Use    Smoking status: Never    Smokeless tobacco: Never   Substance Use Topics    Alcohol use: Never      Prior to Admission medications    Medication Sig Start Date End Date Taking? Authorizing Provider   furosemide (LASIX) 20 MG tablet Take 1 tablet by mouth daily    Tahira Tate MD   amiodarone (PACERONE) 100 MG tablet Take 1 tablet by mouth daily    Tahira Tate MD   ferrous sulfate (IRON 325) 325 (65 Fe) MG tablet Take 1 tablet by mouth daily 9/6/23   Tahira Tate MD   pantoprazole (PROTONIX) 40 MG tablet Take 1 tablet by mouth daily 8/11/23   Tahira Tate MD   tamsulosin (FLOMAX) 0.4 MG capsule As Directed Oral for 0 4/4/18   Lea

## 2025-01-31 ENCOUNTER — OFFICE VISIT (OUTPATIENT)
Age: 89
End: 2025-01-31
Payer: MEDICARE

## 2025-01-31 VITALS
WEIGHT: 196 LBS | DIASTOLIC BLOOD PRESSURE: 80 MMHG | SYSTOLIC BLOOD PRESSURE: 136 MMHG | RESPIRATION RATE: 17 BRPM | BODY MASS INDEX: 27.44 KG/M2 | HEIGHT: 71 IN | HEART RATE: 91 BPM | OXYGEN SATURATION: 98 %

## 2025-01-31 DIAGNOSIS — C44.02 SQUAMOUS CELL CARCINOMA OF SKIN OF LOWER LIP: ICD-10-CM

## 2025-01-31 DIAGNOSIS — C44.42 SQUAMOUS CELL CARCINOMA OF SKIN OF SCALP: Primary | ICD-10-CM

## 2025-01-31 PROCEDURE — 99213 OFFICE O/P EST LOW 20 MIN: CPT | Performed by: OTOLARYNGOLOGY

## 2025-01-31 PROCEDURE — 1036F TOBACCO NON-USER: CPT | Performed by: OTOLARYNGOLOGY

## 2025-01-31 PROCEDURE — G8427 DOCREV CUR MEDS BY ELIG CLIN: HCPCS | Performed by: OTOLARYNGOLOGY

## 2025-01-31 PROCEDURE — G8419 CALC BMI OUT NRM PARAM NOF/U: HCPCS | Performed by: OTOLARYNGOLOGY

## 2025-01-31 PROCEDURE — 1159F MED LIST DOCD IN RCRD: CPT | Performed by: OTOLARYNGOLOGY

## 2025-01-31 PROCEDURE — 1123F ACP DISCUSS/DSCN MKR DOCD: CPT | Performed by: OTOLARYNGOLOGY

## 2025-01-31 ASSESSMENT — ENCOUNTER SYMPTOMS
COUGH: 0
ABDOMINAL PAIN: 0
STRIDOR: 0
VOICE CHANGE: 0
SINUS PAIN: 0
APNEA: 0
SINUS PRESSURE: 0
EYE DISCHARGE: 0
CHOKING: 0
NAUSEA: 0
BACK PAIN: 0
TROUBLE SWALLOWING: 0
RHINORRHEA: 0
SHORTNESS OF BREATH: 0
VOMITING: 0
SORE THROAT: 0
EYE ITCHING: 0

## 2025-01-31 NOTE — PROGRESS NOTES
Subjective:    Aaron Santamaria   89 y.o.   1935     Followup Visit    Chief Complaint   Patient presents with    Follow-up     Skin cancer consult lip area     History of Present Illness:    1/3/2024- office    88-year-old male presents with a vertex scalp lesion.  He does have history of several skin cancers in the past, notably on the ear.  Scalp lesion was noted for several weeks had a biopsy done in December 2023, per report positive for squamous cell carcinoma.  Sent here for further excision.  He does not take any aspirin or blood thinners.    2/2/24  1 week f/u wound check    2/16/2024  2-week follow-up for wound check.  No pain or drainage reported    3/22/2024  1 month follow-up, he has been doing well, no complaints    1/31/2025  Patient comes in today with a new skin lesion sent over by dermatologist.  Has a right lower lip squamous cell carcinoma.  Noted a bump a couple of months ago he is having some soreness and tenderness.    Review of Systems   Constitutional:  Negative for activity change, appetite change, chills, fatigue and fever.   HENT:  Positive for mouth sores. Negative for congestion, ear discharge, ear pain, nosebleeds, postnasal drip, rhinorrhea, sinus pressure, sinus pain, sneezing, sore throat, tinnitus, trouble swallowing and voice change.    Eyes:  Negative for discharge, itching and visual disturbance.   Respiratory:  Negative for apnea, cough, choking, shortness of breath and stridor.    Cardiovascular:  Negative for chest pain and palpitations.   Gastrointestinal:  Negative for abdominal pain, nausea and vomiting.   Endocrine: Negative for cold intolerance and heat intolerance.   Genitourinary:  Negative for difficulty urinating and dysuria.   Musculoskeletal:  Positive for gait problem. Negative for arthralgias, back pain, myalgias, neck pain and neck stiffness.   Skin:  Negative for rash and wound.   Allergic/Immunologic: Negative for environmental allergies and food

## 2025-02-06 ENCOUNTER — PROCEDURE VISIT (OUTPATIENT)
Age: 89
End: 2025-02-06

## 2025-02-06 VITALS
HEART RATE: 79 BPM | SYSTOLIC BLOOD PRESSURE: 134 MMHG | OXYGEN SATURATION: 97 % | HEIGHT: 71 IN | DIASTOLIC BLOOD PRESSURE: 80 MMHG | BODY MASS INDEX: 27.44 KG/M2 | WEIGHT: 196 LBS | RESPIRATION RATE: 17 BRPM

## 2025-02-06 DIAGNOSIS — C44.02 SQUAMOUS CELL CARCINOMA OF SKIN OF LOWER LIP: Primary | ICD-10-CM

## 2025-02-06 NOTE — PROGRESS NOTES
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    Informed consent is obtained.  The right lower lip skin cancer involving the vermilion border is prepped with alcohol and then injected with 5 mL of 1% lidocaine with 1-100,000 parts epinephrine.  I prepped the skin with Betadine and then he is draped in sterile fashion.  Using a marking pen I fashioned a wedge excision sparing the right oral commissure.  15 blade is used to excise the skin and then vestibule mucosa.  Sharp dissection is then carried out through the muscular layer to excise a full-thickness wedge.  Bleeders are controlled with bipolar cautery.  The excised tissue is suture marked at the superior tip which is the apex of the wedge on the mucosal side.  It is then placed in formalin.    Once hemostasis is noted we closed the wedge defect primarily in layers with a 4-0 Vicryl reapproximation of the orbicularis oris muscle and then also the subcutaneous layer.  The skin layer then closed using 5-0 fast absorbing plain gut for the cutaneous portion and then a 3-0 chromic suture starting at the vermilion border and then including the dry lip and also vestibular mucosal reapproximation.    The skin is cleaned and dried and Steri-Strips and a pressure dressing are applied over the cutaneous portion.  The lip and oral vestibule closure is coated with mupirocin ointment.    Procedure was tolerated well.

## 2025-02-11 LAB
CPT BILLING CODE: NORMAL
DIAGNOSIS SYNOPSIS:: NORMAL
DX ICD CODE: NORMAL
PATH REPORT.FINAL DX SPEC: NORMAL
PATH REPORT.FINAL DX SPEC: NORMAL
PATH REPORT.GROSS SPEC: NORMAL
PATH REPORT.SITE OF ORIGIN SPEC: NORMAL
PATHOLOGIST NAME: NORMAL
PAYMENT PROCEDURE: NORMAL

## 2025-02-13 ENCOUNTER — OFFICE VISIT (OUTPATIENT)
Age: 89
End: 2025-02-13

## 2025-02-13 VITALS
OXYGEN SATURATION: 100 % | DIASTOLIC BLOOD PRESSURE: 80 MMHG | HEART RATE: 96 BPM | SYSTOLIC BLOOD PRESSURE: 140 MMHG | BODY MASS INDEX: 27.44 KG/M2 | HEIGHT: 71 IN | RESPIRATION RATE: 20 BRPM | WEIGHT: 196 LBS

## 2025-02-13 DIAGNOSIS — C44.02 SQUAMOUS CELL CARCINOMA OF SKIN OF LOWER LIP: Primary | ICD-10-CM

## 2025-02-13 PROCEDURE — 99024 POSTOP FOLLOW-UP VISIT: CPT | Performed by: OTOLARYNGOLOGY

## 2025-02-13 NOTE — PROGRESS NOTES
Subjective:    Aaron Santamaria   89 y.o.   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  He does have history of several skin cancers in the past, notably on the ear.  Scalp lesion was noted for several weeks had a biopsy done in December 2023, per report positive for squamous cell carcinoma.  Sent here for further excision.  He does not take any aspirin or blood thinners.    2/2/24  1 week f/u wound check    2/16/2024  2-week follow-up for wound check.  No pain or drainage reported    3/22/2024  1 month follow-up, he has been doing well, no complaints    1/31/2025  Patient comes in today with a new skin lesion sent over by dermatologist.  Has a right lower lip squamous cell carcinoma.  Noted a bump a couple of months ago he is having some soreness and tenderness.    2/13/2025  1 week follow-up today status post wedge excision of a right lower lip squamous cell carcinoma.  He is overall doing well.  States pain is minimal.         Past Medical History:   Diagnosis Date    Atrial fibrillation, controlled (HCC)     Melanoma (HCC)     Sleep apnea     no cpap    Stomach ulcer      Past Surgical History:   Procedure Laterality Date    AXILLARY SURGERY Left 1/17/2024    WIDE EXCISION OF LEFT EXTREMITY MELANOMA WITH SENTINEL LYMPH NODE BIOPSY performed by Vaishali Betancourt MD at [facility] MAIN OR    CHOLECYSTECTOMY      KNEE ARTHROPLASTY Right       No family history on file.  Social History     Tobacco Use    Smoking status: Never    Smokeless tobacco: Never   Substance Use Topics    Alcohol use: Never      Prior to Admission medications    Medication Sig Start Date End Date Taking? Authorizing Provider   furosemide (LASIX) 20 MG tablet Take 1 tablet by mouth daily    Provider, MD Tahira   amiodarone

## 2025-02-13 NOTE — PROGRESS NOTES
Identified pt with two pt identifiers(name and ). Reviewed record in preparation for visit and have obtained necessary documentation. All patient medications has been reviewed.  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   No       2. Have seen or consulted any other health care provider since your last visit? No        Patient is accompanied by self I have received verbal consent from Aaron Santamaria to discuss any/all medical information while they are present in the room.

## (undated) DEVICE — LIQUIBAND RAPID ADHESIVE 36/CS 0.8ML: Brand: MEDLINE

## (undated) DEVICE — APPLICATOR MEDICATED 26 CC SOLUTION HI LT ORNG CHLORAPREP

## (undated) DEVICE — SUTURE PROL SZ 2-0 L30IN NONABSORBABLE BLU L26MM SH 1/2 CIR 8833H

## (undated) DEVICE — MAGNETIC INSTR DRAPE 20X16: Brand: MEDLINE INDUSTRIES, INC.

## (undated) DEVICE — SOUTHSIDE TURNOVER: Brand: MEDLINE INDUSTRIES, INC.

## (undated) DEVICE — SUTURE VCRL SZ 3-0 L27IN ABSRB UD L26MM SH 1/2 CIR J416H

## (undated) DEVICE — SYRINGE MED 10ML LUERLOCK TIP W/O SFTY DISP

## (undated) DEVICE — GLOVE SURG SZ 65 CRM LTX FREE POLYISOPRENE POLYMER BEAD ANTI

## (undated) DEVICE — SUTURE PERMA-HAND SZ 2-0 L30IN NONABSORBABLE BLK L26MM SH K833H

## (undated) DEVICE — MINOR GENERAL PACK: Brand: MEDLINE INDUSTRIES, INC.

## (undated) DEVICE — DRAPE,TOP,102X53,STERILE: Brand: MEDLINE

## (undated) DEVICE — SOLUTION IRRIG 500ML 0.9% SOD CHLO USP POUR PLAS BTL

## (undated) DEVICE — SYRINGE IRRIG 60ML SFT PLIABLE BLB EZ TO GRP 1 HND USE W/

## (undated) DEVICE — GLOVE SURG SZ 7 L12IN FNGR THK79MIL GRN LTX FREE

## (undated) DEVICE — DISSECTOR ENDOSCP L21CM TIP CURVATURE 40DEG FN CRV JAW VES

## (undated) DEVICE — BLADE,CARBON-STEEL,15,STRL,DISPOSABLE,TB: Brand: MEDLINE

## (undated) DEVICE — GARMENT,MEDLINE,DVT,INT,CALF,MED, GEN2: Brand: MEDLINE

## (undated) DEVICE — SUTURE MCRYL + SZ 4-0 L27IN ABSRB UD L19MM PS-2 3/8 CIR MCP426H

## (undated) DEVICE — CLIP LIG M BLU TI HRT SHP WIRE HORZ 600 PER BX

## (undated) DEVICE — Device: Brand: JELCO